# Patient Record
Sex: MALE | Race: WHITE
[De-identification: names, ages, dates, MRNs, and addresses within clinical notes are randomized per-mention and may not be internally consistent; named-entity substitution may affect disease eponyms.]

---

## 2017-08-01 ENCOUNTER — HOSPITAL ENCOUNTER (OUTPATIENT)
Dept: HOSPITAL 80 - FIMAGING | Age: 62
End: 2017-08-01
Attending: INTERNAL MEDICINE
Payer: COMMERCIAL

## 2017-08-01 DIAGNOSIS — R10.11: Primary | ICD-10-CM

## 2017-10-25 NOTE — GHP
[f rep st]



                                                       PREOP HISTORY AND PHYSICAL





DATE OF ADMISSION:  10/27/2017



CHIEF COMPLAINT:  Chronic right upper quadrant abdominal pain with hernia.



HISTORY OF PRESENT ILLNESS:  The patient is a 62-year-old man, who presented to the clinic with chron
ic abdominal pain.  The pain is located in the central and right upper quadrant.  He had episodes of 
worsening pain associated with nausea, diarrhea and occasional vomiting.  He reported decreased appet
ite and has lost about 50 pounds over the last year.  The pain he reports is mild and dull with sever
e episodes that double him over.  He has a known hernia of his umbilicus.  He has had an extensive GI
 workup including capsule endoscopy which was normal.  He had an MRCP on July 17 which showed no duct
al dilatation or choledocholithiasis.  His small bowel series which showed focal hold of contrast in 
the small bowel loop in the left lower quadrant without significant obstruction, low-grade inflammati
on or transient contraction suspected.  He had a gastric emptying study in June 2017 which was normal
.  He had an endoscopy in May 2017 which was normal, and colonoscopy which showed decreased sphincter
 tone, non thrombosed external hemorrhoids, sigmoid diverticulosis and 1 hyperplastic polyp.  Followi
ng his visit with us in the office, he had a CT abdomen performed that showed a small fat containing 
periumbilical hernia measuring 2.6 cm.  There is an additional hernia more superior and right isidoro me
jody fat containing ventral abdominal wall hernia, the hernia neck measuring 7 mm.  At this time, he 
would like to proceed with ventral hernia repairs with mesh.



PAST MEDICAL HISTORY:  Asthma, hypertension, hyperlipidemia.



PAST SURGICAL HISTORY:  Previous hernia repair, lap choly, spinal surgery.



MEDICATIONS:  Unchanged.



ALLERGIES:  No known drug allergies.



FAMILY HISTORY:  Father with coronary artery disease.



SOCIAL HISTORY:  He denies current tobacco, alcohol or recreational drug use.  He is a former smoker.
  He is .  He is retired.



REVIEW OF SYSTEMS:  A 10-point review of systems negative aside from HPI.



PHYSICAL EXAMINATION:  GENERAL:  Well-developed, well-nourished man, in no acute distress.  HEENT:  N
ormocephalic, atraumatic.  No hearing deficits.  Pupils equal and round.  No scleral icterus.  Mucous
 membranes moist.  NECK:  Trachea midline.  RESPIRATORY:  Clear to auscultation bilaterally.  No incr
eased work of breathing.  CARDIOVASCULAR:  Regular rate and rhythm.  No peripheral edema.  ABDOMEN:  
Tenderness to palpation of the right upper quadrant and periumbilical with palpable hernias.  No rebo
und or guarding.  No palpable masses.  Bowel sounds present.  SKIN:  Warm and dry.  PSYCH:  Mood and 
affect normal.  NEURO:  Grossly intact.



IMPRESSION AND PLAN:  A 62-year-old man with 2 ventral hernias.  He is scheduled for an exploratory l
aparoscopy with open ventral hernia repairs with mesh.  We discussed risks of surgery including, but 
not limited to, heart attack, stroke, blood clots, death.  We discussed risk of infection, bleeding, 
damage to surrounding structures, need for additional procedures or recurrence.  He understands the r
isks and would like to proceed.  The patient was additionally seen by Dr. Eileen Frazier.





Job #:  [id number]

## 2017-10-27 ENCOUNTER — HOSPITAL ENCOUNTER (OUTPATIENT)
Dept: HOSPITAL 80 - F3E | Age: 62
Setting detail: OBSERVATION
Discharge: HOME | End: 2017-10-27
Attending: SURGERY | Admitting: SURGERY
Payer: COMMERCIAL

## 2017-10-27 VITALS
HEART RATE: 67 BPM | TEMPERATURE: 98.2 F | SYSTOLIC BLOOD PRESSURE: 137 MMHG | OXYGEN SATURATION: 95 % | RESPIRATION RATE: 18 BRPM | DIASTOLIC BLOOD PRESSURE: 75 MMHG

## 2017-10-27 DIAGNOSIS — J45.909: ICD-10-CM

## 2017-10-27 DIAGNOSIS — E78.5: ICD-10-CM

## 2017-10-27 DIAGNOSIS — I10: ICD-10-CM

## 2017-10-27 DIAGNOSIS — K43.6: Primary | ICD-10-CM

## 2017-10-27 PROCEDURE — 49653: CPT

## 2017-10-27 PROCEDURE — G0378 HOSPITAL OBSERVATION PER HR: HCPCS

## 2017-10-27 PROCEDURE — 0WUF4JZ SUPPLEMENT ABDOMINAL WALL WITH SYNTHETIC SUBSTITUTE, PERCUTANEOUS ENDOSCOPIC APPROACH: ICD-10-PCS | Performed by: SURGERY

## 2017-10-27 PROCEDURE — C1781 MESH (IMPLANTABLE): HCPCS

## 2017-10-27 RX ADMIN — Medication PRN MCG: at 09:26

## 2017-10-27 RX ADMIN — HYDROMORPHONE HYDROCHLORIDE PRN MG: 1 INJECTION, SOLUTION INTRAMUSCULAR; INTRAVENOUS; SUBCUTANEOUS at 10:04

## 2017-10-27 RX ADMIN — ONDANSETRON PRN MG: 2 SOLUTION INTRAMUSCULAR; INTRAVENOUS at 10:07

## 2017-10-27 RX ADMIN — Medication PRN MCG: at 09:19

## 2017-10-27 RX ADMIN — HYDROCODONE BITARTRATE AND ACETAMINOPHEN PRN TAB: 5; 325 TABLET ORAL at 11:06

## 2017-10-27 RX ADMIN — HYDROMORPHONE HYDROCHLORIDE PRN MG: 1 INJECTION, SOLUTION INTRAMUSCULAR; INTRAVENOUS; SUBCUTANEOUS at 09:43

## 2017-10-27 RX ADMIN — ONDANSETRON PRN MG: 2 SOLUTION INTRAMUSCULAR; INTRAVENOUS at 09:26

## 2017-10-27 RX ADMIN — HYDROMORPHONE HYDROCHLORIDE PRN MG: 1 INJECTION, SOLUTION INTRAMUSCULAR; INTRAVENOUS; SUBCUTANEOUS at 10:25

## 2017-10-27 RX ADMIN — HYDROCODONE BITARTRATE AND ACETAMINOPHEN PRN TAB: 5; 325 TABLET ORAL at 15:10

## 2017-10-27 RX ADMIN — HYDROMORPHONE HYDROCHLORIDE PRN MG: 1 INJECTION, SOLUTION INTRAMUSCULAR; INTRAVENOUS; SUBCUTANEOUS at 09:53

## 2017-10-27 NOTE — POSTOPPROG
Post Op Note


Date of Operation: 10/27/17


Surgeon: Eileen Frazier


Assistant: janice


Anesthesiologist: manuela


Anesthesia: GET(General Endotracheal)


Pre-op Diagnosis: ventral hernia x2, RUQ pain


Post-op Diagnosis: incarcerated ventral hernias


Indication: 62y M with symptomatic ventral hernias, RUQ pain


Procedure: laparoscopic ventral hernia repair with mesh


Findings: omentum incarcerated in ventral hernias, no other obvious source of 

pain 


Inf/Abcess present in the surg proc area at time of surgery?: No


Depth: Deep Incisional (Fascial)


EBL: Minimal


Specimen(s): 





none

## 2017-10-27 NOTE — GOP
[f 
rep st]



                                                                OPERATIVE REPORT





DATE OF OPERATION:  10/27/2017



SURGEON:  Eileen Frazier MD



ASSISTANT:  JESSY Mayfield



ANESTHESIA:  General.



ANESTHESIOLOGIST:  Pawel Jimenez MD



PREOPERATIVE DIAGNOSIS:  Right upper quadrant pain and incarcerated ventral 
hernia x2.



POSTOPERATIVE DIAGNOSIS:  Right upper quadrant pain and incarcerated ventral 
hernia x2.



PROCEDURE PERFORMED:  Laparoscopic lysis of adhesions and laparoscopic ventral 
hernia repair with mesh.



FINDINGS:  Two small approximately 1 cm hernia defects in the midline; no 
obvious defects in the right upper quadrant.  The colon appeared normal.  There 
were no abnormalities at the previous site of his gallbladder surgery.



SPECIMENS:  None.



ESTIMATED BLOOD LOSS:  10 cc.



INDICATIONS:  The patient is a 62-year-old man who has had abdominal pain, and 
significant weight loss.  CT imaging was significant for 2 small incarcerated 
ventral hernias but no specific findings in the right upper quadrant.



DESCRIPTION OF PROCEDURE:  The patient was brought into the operating room, 
placed supine on the table, and general anesthesia was administered.  His 
abdomen was prepped and draped in the usual sterile fashion.  I infiltrated all 
sites with 0.5% Marcaine prior to making incisions.  I used a total of 30 cc of 
0.5% Marcaine throughout the case.  I made an incision in his left lower 
quadrant.  I elevated the skin and soft tissue.  I inserted the Veress needle.  
It passed the hanging drop test.  His abdomen insufflated easily to a pressure 
of 15 mmHg.  I explored his abdomen.  He had incarcerated omentum along the 
midline.  I explored the right upper quadrant where I had marked preoperatively 
and did not notice any abnormalities in this area.  I placed an additional 5 mm 
trocar in the left upper quadrant.  I then carefully reduced the omentum with 
the Harmonic.  It took time to reduce the omentum from the larger of the 2 
defects.  Once this was totally reduced, I then explored his right upper 
quadrant further.  The colon appeared normal.  I did not see any abnormalities 
that could explain his pain.  His skin appeared very thin, and I was concerned 
I would have wound healing problems.  I then elected to perform a laparoscopic 
hernia repair with mesh.  I inserted a 10 mm trocar in between the 1st and 2nd 
trocars.  I selected a piece of Symbotex mesh 10 x 15 cm, which is a dual layer 
mesh.  I placed 4 tacking sutures in the 4 quadrants.  There was at least 3 cm 
overlap of the hernias in each direction.  I used a Rashel-Andrae device, and 
I tacked the 4 corners trans-fascially.  I then used an AbsorbaTack to place 2 
concentric circles in the mesh.  During this time, the pressure was reduced to 
10.  The sutures were tied down.  The mesh was in adequate position.  I did 
watch it as I continued to desufflate the abdomen.  I removed the trocars.  I 
closed the fascia at the 10 mm trocar site with 0 Vicryl.  I closed all skin 
with 4-0 Monocryl.  Dermabond applied.  He was awakened in the operating room, 
extubated, and transferred to PACU in stable condition.





Job #:  [id number]

MTDD

## 2017-10-27 NOTE — PDANEPAE
ANE History of Present Illness


62 year old male w/ PMHx of HTN, asthma, TIA (states he still has left mouth 

corner down turn) with nausea and vomiting presents for ventral hernia repair.  





ANE Past Medical History





- Cardiovascular History


Hx Hypertension: Yes


Hx Arrhythmias: Yes


Hx Chest Pain: No


Hx Coronary Artery / Peripheral Vascular Disease: No


Hx CHF / Valvular Disease: No


Hx Palpitations: Yes


Cardiovascular History Comment: HYPERLIPIDEMIA.  IRREGULAR RHYTHM AT TIMES PER 

PT





- Pulmonary History


Hx COPD: No


Hx Asthma/Reactive Airway Disease: Yes


Hx Recent Upper Respiratory Infection: No


Hx Oxygen in Use at Home: No


Hx Sleep Apnea: No


Sleep Apnea Screening Result - Last Documented: Negative


Pulmonary History Comment: INHALER.  AT HOME NEBULIZER





- Neurologic History


Hx Cerebrovascular Accident: No


Hx Seizures: No


Hx Dementia: No


Neurologic History Comment: TIAs 20 YRS AGO





- Endocrine History


Hx Diabetes: No


Hypothyroid: No


Hyperthyroid: No


Obesity: no





- Renal History


Hx Renal Disorders: No





- Liver History


Hx Hepatic Disorders: No





- Neurological & Psychiatric Hx


Hx Neurological and Psychiatric Disorders: No





- Cancer History


Hx Cancer: No





- Congenital Disorder History


Hx Congenital Disorders: No





- GI History


Hx Gastrointestinal Disorders: Yes


Gastrointestinal History Comment: ABD PAIN,NAUSEA,VOMITING, DIARRHEA & LACK OF 

APPETITE WITH WT LOSS





- Other Health History


Other Health History: EASY BRUISING





- Chronic Pain History


Chronic Pain: Yes (R ABD PAIN)





- Surgical History


Prior Surgeries: HERNIA REPAIR.  CHOLECYSTECTOMY.  SPINAL FUSION LUMBAR





ANE Review of Systems


Review of systems is: negative


Review of Systems: 








- Exercise capacity


Exercise capacity: <4 METS


METS (RN): 3 METS





ANE Patient History





- Allergies


Allergies/Adverse Reactions: 








No Known Allergies Allergy (Unverified 10/25/17 09:36)


 








- Home Medications


Home medications: home medication list seen and reviewed


Home Medications: 








Aspirin  10/25/17 [Last Taken Unknown]


Dicyclomine  10/25/17 [Last Taken Unknown]


Diltiazem  10/25/17 [Last Taken Unknown]


Esomeprazole Sodium  10/25/17 [Last Taken Unknown]


Herbals/Supplements -Info Only  10/25/17 [Last Taken Unknown]


Losartan Potassium  10/25/17 [Last Taken Unknown]


Montelukast Sodium  10/25/17 [Last Taken Unknown]


Pravastatin Sodium  10/25/17 [Last Taken Unknown]


Promethazine HCl  10/25/17 [Last Taken Unknown]


Triamterene/Hydrochlorothiazid  10/25/17 [Last Taken Unknown]


Zofran  10/25/17 [Last Taken Unknown]


traZODone  10/25/17 [Last Taken Unknown]








- NPO status


NPO Status: no food or drink >8 hours


NPO Since - Liquids (Date): 10/26/17


NPO Since - Liquids (Time): 20:00


NPO Since - Solids (Date): 10/26/17


NPO Since - Solids (Time): 18:00





- Anes Hx


Anes Hx: no prior problems





- Smoking Hx


Smoking Status: Never smoked


Marijuana use: No





- Alcohol Use


Alcohol Use: None





- Family Anes Hx


Family Anes Hx: neg - N/A


Family Hx Anesthesia Complications: NEG





ANE Labs/Vital Signs





- Vital Signs


Vital Signs: reviewed preoperatively; see RN documention for details


Blood Pressure: 112/81


Heart Rate: 76


Respiratory Rate: 16


O2 Sat (%): 93


Height: 182.88 cm


Weight: 92.533 kg





ANE Physical Exam





- Airway


Neck exam: FROM


Mallampati Score: Class 2


Mouth exam: poor dentition, dentures, beard





- Pulmonary


Pulmonary: no respiratory distress





- Cardiovascular


Cardiovascular: regular rate and rhythym





- ASA Status


ASA Status: III





ANE Anesthesia Plan


Anesthesia Plan: general endotracheal anesthesia


Total IV Anesthesia: No

## 2017-10-27 NOTE — SOAPPROG
SOAP Progress Note


Assessment/Plan: 


Assessment:





POD # 0 s/p lap ventral hernia repair for incarcerated ventral hernia


doing better than expected and would like to go home





F/U 2 weeks




















Plan:





10/27/17 16:39





Objective: 





 Vital Signs











Temp Pulse Resp BP Pulse Ox


 


 36.8 C   67   18   137/75 H  95 


 


 10/27/17 15:39  10/27/17 15:39  10/27/17 15:39  10/27/17 15:39  10/27/17 15:39








 











 10/26/17 10/27/17 10/28/17





 05:59 05:59 05:59


 


Intake Total   1575


 


Output Total   10


 


Balance   1565














ICD10 Worksheet


Patient Problems: 


 Problems











Problem Status Onset


 


Incarcerated ventral hernia Acute  














- ICD10 Problem Qualifiers


(1) Incarcerated ventral hernia

## 2017-10-27 NOTE — POSTANESTH
Post Anesthetic Evaluation


Cardiovascular Status: Normal, Stable, Similar to Pre-Op Cond


Respiratory Status: Normal, Stable, Similar to Pre-op Cond.


Level of Consciousness/Mental Status: Alert and Oriented


Pain Control: Adequate, Prn Tx Ordered


Nausea/Vomiting Control: Adequate, Prn Tx Ordered


Complications Possibly Related to Anesthesia: None Noted

## 2017-10-28 NOTE — ASDISCHSUM
----------------------------------------------

Discharge Information

----------------------------------------------

Plan Status:                                         Medically Cleared to Leave:

Discharge Date:10/27/2017 05:22 PM                    D/C Disposition:

ADT D/C Disposition:Home, Routine, Self-Care         Projected Discharge Date:10/27/2017 05:22 PM

Transportation at D/C:                               Discharge Delay Reason:

Follow-Up Date:10/27/2017 05:22 PM                   Discharge Slot:

Final Diagnosis:

----------------------------------------------

Placement Information

----------------------------------------------

----------------------------------------------

Patient Contact Information

----------------------------------------------

Contact Name:PIPER                             Relationship:Wife

Address:                                             Home [phone]

                                                     Work Phone:

City:                                                [facility] Phone:

State/[place] Code:                                      Email:

----------------------------------------------

Financial Information

----------------------------------------------

Financial Class:HMO and PPO Plans

Primary Plan Desc:[employer] HMO SIG ADM          Primary Plan Number:[id number]

Secondary Plan Desc:                                 Secondary Plan Number:

 

 

----------------------------------------------

Assessment Information

----------------------------------------------

----------------------------------------------

Intervention Information

----------------------------------------------

## 2019-06-07 ENCOUNTER — HOSPITAL ENCOUNTER (INPATIENT)
Dept: HOSPITAL 80 - F3E | Age: 64
LOS: 13 days | Discharge: HOME | End: 2019-06-20
Payer: COMMERCIAL

## 2022-04-19 ENCOUNTER — APPOINTMENT (RX ONLY)
Dept: URBAN - METROPOLITAN AREA CLINIC 307 | Facility: CLINIC | Age: 67
Setting detail: DERMATOLOGY
End: 2022-04-19

## 2022-04-19 DIAGNOSIS — D485 NEOPLASM OF UNCERTAIN BEHAVIOR OF SKIN: ICD-10-CM

## 2022-04-19 PROBLEM — D48.5 NEOPLASM OF UNCERTAIN BEHAVIOR OF SKIN: Status: ACTIVE | Noted: 2022-04-19

## 2022-04-19 PROCEDURE — ? BIOPSY BY SHAVE METHOD

## 2022-04-19 PROCEDURE — 11102 TANGNTL BX SKIN SINGLE LES: CPT

## 2022-04-19 PROCEDURE — ? COUNSELING

## 2022-04-19 ASSESSMENT — LOCATION ZONE DERM: LOCATION ZONE: FINGER

## 2022-04-19 ASSESSMENT — LOCATION DETAILED DESCRIPTION DERM: LOCATION DETAILED: LEFT PROXIMAL DORSAL MIDDLE FINGER

## 2022-04-19 ASSESSMENT — LOCATION SIMPLE DESCRIPTION DERM: LOCATION SIMPLE: LEFT MIDDLE FINGER

## 2022-05-04 ENCOUNTER — APPOINTMENT (RX ONLY)
Dept: URBAN - METROPOLITAN AREA CLINIC 307 | Facility: CLINIC | Age: 67
Setting detail: DERMATOLOGY
End: 2022-05-04

## 2022-05-04 PROBLEM — D04.62 CARCINOMA IN SITU OF SKIN OF LEFT UPPER LIMB, INCLUDING SHOULDER: Status: ACTIVE | Noted: 2022-05-04

## 2022-05-04 PROCEDURE — ? PRESCRIPTION

## 2022-05-04 PROCEDURE — ? MOHS SURGERY

## 2022-05-04 PROCEDURE — 17312 MOHS ADDL STAGE: CPT

## 2022-05-04 PROCEDURE — 17311 MOHS 1 STAGE H/N/HF/G: CPT

## 2022-05-04 RX ORDER — MUPIROCIN 20 MG/G
OINTMENT TOPICAL
Qty: 22 | Refills: 1 | Status: ERX | COMMUNITY
Start: 2022-05-04

## 2022-05-04 RX ADMIN — MUPIROCIN: 20 OINTMENT TOPICAL at 00:00
